# Patient Record
Sex: FEMALE | Race: OTHER | HISPANIC OR LATINO | URBAN - METROPOLITAN AREA
[De-identification: names, ages, dates, MRNs, and addresses within clinical notes are randomized per-mention and may not be internally consistent; named-entity substitution may affect disease eponyms.]

---

## 2020-04-01 ENCOUNTER — EMERGENCY (EMERGENCY)
Facility: HOSPITAL | Age: 65
LOS: 1 days | Discharge: HOME | End: 2020-04-01
Attending: EMERGENCY MEDICINE
Payer: SELF-PAY

## 2020-04-01 VITALS
HEART RATE: 67 BPM | DIASTOLIC BLOOD PRESSURE: 61 MMHG | HEIGHT: 60 IN | OXYGEN SATURATION: 99 % | TEMPERATURE: 98 F | SYSTOLIC BLOOD PRESSURE: 186 MMHG | WEIGHT: 134.92 LBS | RESPIRATION RATE: 16 BRPM

## 2020-04-01 PROCEDURE — 99283 EMERGENCY DEPT VISIT LOW MDM: CPT

## 2020-04-01 PROCEDURE — 93005 ELECTROCARDIOGRAM TRACING: CPT

## 2020-04-01 RX ORDER — METOCLOPRAMIDE HCL 10 MG
10 TABLET ORAL ONCE
Refills: 0 | Status: COMPLETED | OUTPATIENT
Start: 2020-04-01 | End: 2020-04-01

## 2020-04-01 RX ORDER — ACETAMINOPHEN 500 MG
650 TABLET ORAL ONCE
Refills: 0 | Status: COMPLETED | OUTPATIENT
Start: 2020-04-01 | End: 2020-04-01

## 2020-04-01 NOTE — ED ADULT NURSE NOTE - NSIMPLEMENTINTERV_GEN_ALL_ED
Implemented All Universal Safety Interventions:  [place] to call system. Call bell, personal items and telephone within reach. Instruct patient to call for assistance. Room bathroom lighting operational. Non-slip footwear when patient is off stretcher. Physically safe environment: no spills, clutter or unnecessary equipment. Stretcher in lowest position, wheels locked, appropriate side rails in place.

## 2020-04-01 NOTE — ED PROVIDER NOTE - CLINICAL SUMMARY MEDICAL DECISION MAKING FREE TEXT BOX
64 y/o F with h/o HTN, DM, stroke here with headache.  Pt reports gradual onset band like pain around her head, took her blood pressure and it was high.  No focal neuro deficits, no s/s end organ damage on exam.  Given h/o previous stroke, will obtain ekg, labs, cxr, ct head.  No fever or neck stiffness to suggest meningismus, not sudden onset to suggest SAH, will pain control and reassess.

## 2020-04-01 NOTE — ED ADULT TRIAGE NOTE - CHIEF COMPLAINT QUOTE
son in-law states patient is having high blood pressure, dizzy and pain on her left jaw for 3 days on and off.

## 2020-04-01 NOTE — ED PROVIDER NOTE - PATIENT PORTAL LINK FT
You can access the FollowMyHealth Patient Portal offered by [facility] by registering at the following website: [email or web address]. By joining [facility]’s FollowMyHealth portal, you will also be able to view your health information using other applications (apps) compatible with our system.

## 2020-04-01 NOTE — ED ADULT NURSE NOTE - OBJECTIVE STATEMENT
The patient presented with elevated BP with dizziness.  She states that she feel better and doesn't want further interventions.  No distress.

## 2020-04-01 NOTE — ED PROVIDER NOTE - OBJECTIVE STATEMENT
64 y/o F with h/o HTN, DM, stroke here with headache.  Pt reports gradual onset band like pain around her head, took her blood pressure and it was high.  HA gradual in onset, no associated weakness, numbness, vision changes, nausea or vomiting.  Pt is visiting from [facility], states that her BP has been running high since she got here 1 month ago.  Was seen at [facility] last week for similar sxs.     [id number]

## 2020-04-01 NOTE — ED PROVIDER NOTE - NEUROLOGICAL STRAIGHT LEG RAISE
Ht: 5'1 1/2\"  Wt: 53.5kg  BMI:  21-22    Any significant accidents or injuries in the past year? No    Pt  Instructed on:  May have a light breakfast.  May take all morning meds.  Per Dr. iSmpson documentation, pt is to hold nothing.  No jewelry, valuables.  Pt advised regarding possible pre-payment of out of pocket expenses to be collected at time of admission - pt given [phone] to call if she has questions regarding pre-payment.  Bring insurance card(s)  Pt verbalizes understanding.   normal bilaterally

## 2020-04-01 NOTE — ED PROVIDER NOTE - PROGRESS NOTE DETAILS
Suraj ALTMAN: RN came to obtain labs and give medications, but patient reports that she is feeling better and would like to leave.  Pt is stable, return precautions with any recurring sxs, she spoke to her son who will come pick her up.

## 2024-09-10 NOTE — ED PROVIDER NOTE - TOBACCO USE
Patient discharge and wound care instructions  Colleen RANGEL Marisa  9/10/2024     Continue to monitor the pressure areas for \"blanching\"-if not blanching then GET THE PRESSURE OFF!    If her skin is going to be moist-utilize barrier cream with zinc or dimethicone    Continue to reposition every 2 hours    Return as needed.   Never smoker